# Patient Record
Sex: MALE | Race: WHITE | NOT HISPANIC OR LATINO
[De-identification: names, ages, dates, MRNs, and addresses within clinical notes are randomized per-mention and may not be internally consistent; named-entity substitution may affect disease eponyms.]

---

## 2017-01-31 ENCOUNTER — RX RENEWAL (OUTPATIENT)
Age: 62
End: 2017-01-31

## 2017-02-15 ENCOUNTER — APPOINTMENT (OUTPATIENT)
Dept: PULMONOLOGY | Facility: CLINIC | Age: 62
End: 2017-02-15

## 2017-02-15 VITALS — WEIGHT: 245 LBS | BODY MASS INDEX: 37.25 KG/M2

## 2017-02-15 VITALS — OXYGEN SATURATION: 92 % | HEART RATE: 95 BPM | DIASTOLIC BLOOD PRESSURE: 80 MMHG | SYSTOLIC BLOOD PRESSURE: 160 MMHG

## 2017-03-29 ENCOUNTER — RX RENEWAL (OUTPATIENT)
Age: 62
End: 2017-03-29

## 2017-04-12 ENCOUNTER — APPOINTMENT (OUTPATIENT)
Dept: PULMONOLOGY | Facility: CLINIC | Age: 62
End: 2017-04-12

## 2017-04-12 VITALS
HEART RATE: 142 BPM | RESPIRATION RATE: 16 BRPM | WEIGHT: 240 LBS | OXYGEN SATURATION: 94 % | SYSTOLIC BLOOD PRESSURE: 140 MMHG | BODY MASS INDEX: 36.49 KG/M2 | DIASTOLIC BLOOD PRESSURE: 80 MMHG

## 2017-04-12 DIAGNOSIS — Z86.79 PERSONAL HISTORY OF OTHER DISEASES OF THE CIRCULATORY SYSTEM: ICD-10-CM

## 2017-04-12 RX ORDER — VERAPAMIL HYDROCHLORIDE 300 MG/1
300 CAPSULE, EXTENDED RELEASE ORAL
Refills: 0 | Status: DISCONTINUED | COMMUNITY
End: 2017-04-12

## 2017-04-12 RX ORDER — AZITHROMYCIN 250 MG/1
250 TABLET, FILM COATED ORAL
Qty: 1 | Refills: 3 | Status: DISCONTINUED | COMMUNITY
Start: 2017-02-15 | End: 2017-04-12

## 2017-05-18 ENCOUNTER — RX RENEWAL (OUTPATIENT)
Age: 62
End: 2017-05-18

## 2017-05-27 ENCOUNTER — OUTPATIENT (OUTPATIENT)
Dept: OUTPATIENT SERVICES | Facility: HOSPITAL | Age: 62
LOS: 1 days | End: 2017-05-27
Payer: COMMERCIAL

## 2017-05-27 DIAGNOSIS — G47.33 OBSTRUCTIVE SLEEP APNEA (ADULT) (PEDIATRIC): ICD-10-CM

## 2017-05-27 PROCEDURE — 95806 SLEEP STUDY UNATT&RESP EFFT: CPT | Mod: 26

## 2017-05-27 PROCEDURE — 95806 SLEEP STUDY UNATT&RESP EFFT: CPT

## 2017-06-22 ENCOUNTER — APPOINTMENT (OUTPATIENT)
Dept: PULMONOLOGY | Facility: CLINIC | Age: 62
End: 2017-06-22

## 2017-07-07 ENCOUNTER — APPOINTMENT (OUTPATIENT)
Dept: PULMONOLOGY | Facility: CLINIC | Age: 62
End: 2017-07-07

## 2017-07-07 VITALS
OXYGEN SATURATION: 91 % | HEART RATE: 128 BPM | SYSTOLIC BLOOD PRESSURE: 152 MMHG | WEIGHT: 253 LBS | RESPIRATION RATE: 18 BRPM | DIASTOLIC BLOOD PRESSURE: 90 MMHG | BODY MASS INDEX: 38.47 KG/M2

## 2017-07-07 RX ORDER — BUDESONIDE AND FORMOTEROL FUMARATE DIHYDRATE 80; 4.5 UG/1; UG/1
80-4.5 AEROSOL RESPIRATORY (INHALATION)
Qty: 10 | Refills: 0 | Status: DISCONTINUED | COMMUNITY
Start: 2016-08-09 | End: 2017-07-07

## 2017-10-13 ENCOUNTER — APPOINTMENT (OUTPATIENT)
Dept: PULMONOLOGY | Facility: CLINIC | Age: 62
End: 2017-10-13
Payer: COMMERCIAL

## 2017-10-13 VITALS
WEIGHT: 252 LBS | SYSTOLIC BLOOD PRESSURE: 130 MMHG | HEIGHT: 68 IN | HEART RATE: 88 BPM | OXYGEN SATURATION: 92 % | DIASTOLIC BLOOD PRESSURE: 80 MMHG | BODY MASS INDEX: 38.19 KG/M2

## 2017-10-13 PROCEDURE — 99214 OFFICE O/P EST MOD 30 MIN: CPT

## 2017-10-13 RX ORDER — ALBUTEROL SULFATE 90 UG/1
108 (90 BASE) AEROSOL, METERED RESPIRATORY (INHALATION)
Qty: 1 | Refills: 5 | Status: DISCONTINUED | COMMUNITY
End: 2017-10-13

## 2018-01-12 ENCOUNTER — APPOINTMENT (OUTPATIENT)
Dept: PULMONOLOGY | Facility: CLINIC | Age: 63
End: 2018-01-12
Payer: COMMERCIAL

## 2018-01-12 VITALS — BODY MASS INDEX: 38.01 KG/M2 | WEIGHT: 250 LBS | SYSTOLIC BLOOD PRESSURE: 144 MMHG | DIASTOLIC BLOOD PRESSURE: 68 MMHG

## 2018-01-12 VITALS — OXYGEN SATURATION: 94 % | HEART RATE: 80 BPM

## 2018-01-12 PROCEDURE — 99214 OFFICE O/P EST MOD 30 MIN: CPT

## 2018-01-12 RX ORDER — LOSARTAN POTASSIUM AND HYDROCHLOROTHIAZIDE 12.5; 1 MG/1; MG/1
100-12.5 TABLET ORAL
Qty: 30 | Refills: 0 | Status: DISCONTINUED | COMMUNITY
Start: 2017-02-01 | End: 2018-01-12

## 2018-01-12 RX ORDER — AZITHROMYCIN 250 MG/1
250 TABLET, FILM COATED ORAL
Qty: 45 | Refills: 3 | Status: DISCONTINUED | COMMUNITY
Start: 2017-07-07 | End: 2018-01-12

## 2018-04-27 ENCOUNTER — APPOINTMENT (OUTPATIENT)
Dept: PULMONOLOGY | Facility: CLINIC | Age: 63
End: 2018-04-27
Payer: COMMERCIAL

## 2018-04-27 VITALS — OXYGEN SATURATION: 92 % | HEART RATE: 89 BPM

## 2018-04-27 VITALS — DIASTOLIC BLOOD PRESSURE: 60 MMHG | WEIGHT: 254 LBS | BODY MASS INDEX: 38.62 KG/M2 | SYSTOLIC BLOOD PRESSURE: 140 MMHG

## 2018-04-27 PROCEDURE — 99214 OFFICE O/P EST MOD 30 MIN: CPT

## 2018-09-07 ENCOUNTER — MESSAGE (OUTPATIENT)
Age: 63
End: 2018-09-07

## 2018-09-14 ENCOUNTER — APPOINTMENT (OUTPATIENT)
Dept: PULMONOLOGY | Facility: CLINIC | Age: 63
End: 2018-09-14
Payer: COMMERCIAL

## 2018-09-14 VITALS — HEART RATE: 119 BPM | OXYGEN SATURATION: 93 %

## 2018-09-14 VITALS — DIASTOLIC BLOOD PRESSURE: 70 MMHG | SYSTOLIC BLOOD PRESSURE: 120 MMHG

## 2018-09-14 PROCEDURE — 99214 OFFICE O/P EST MOD 30 MIN: CPT

## 2019-01-04 ENCOUNTER — APPOINTMENT (OUTPATIENT)
Dept: PULMONOLOGY | Facility: CLINIC | Age: 64
End: 2019-01-04

## 2019-02-22 ENCOUNTER — APPOINTMENT (OUTPATIENT)
Dept: PULMONOLOGY | Facility: CLINIC | Age: 64
End: 2019-02-22
Payer: COMMERCIAL

## 2019-02-22 VITALS — SYSTOLIC BLOOD PRESSURE: 130 MMHG | DIASTOLIC BLOOD PRESSURE: 60 MMHG

## 2019-02-22 VITALS — OXYGEN SATURATION: 93 % | HEART RATE: 89 BPM

## 2019-02-22 PROCEDURE — 99214 OFFICE O/P EST MOD 30 MIN: CPT

## 2019-02-22 NOTE — REVIEW OF SYSTEMS
[Fatigue] : fatigue [Cough] : cough [Dyspnea] : dyspnea [As Noted in HPI] : as noted in HPI [Negative] : Pulmonary Hypertension

## 2019-02-22 NOTE — CONSULT LETTER
[Dear  ___] : Dear  [unfilled], [Courtesy Letter:] : I had the pleasure of seeing your patient, [unfilled], in my office today. [Please see my note below.] : Please see my note below. [Consult Closing:] : Thank you very much for allowing me to participate in the care of this patient.  If you have any questions, please do not hesitate to contact me. [Sincerely,] : Sincerely, [Km Kirkland MD] : Km Kirkland MD

## 2019-02-27 ENCOUNTER — RX RENEWAL (OUTPATIENT)
Age: 64
End: 2019-02-27

## 2019-03-14 ENCOUNTER — MEDICATION RENEWAL (OUTPATIENT)
Age: 64
End: 2019-03-14

## 2019-07-12 ENCOUNTER — APPOINTMENT (OUTPATIENT)
Dept: PULMONOLOGY | Facility: CLINIC | Age: 64
End: 2019-07-12

## 2019-07-15 ENCOUNTER — OTHER (OUTPATIENT)
Age: 64
End: 2019-07-15

## 2019-09-25 NOTE — PHYSICAL EXAM
[General Appearance - Well Developed] : well developed [General Appearance - Well Nourished] : well nourished [Normal Conjunctiva] : the conjunctiva exhibited no abnormalities [Neck Appearance] : the appearance of the neck was normal [Jugular Venous Distention Increased] : there was no jugular-venous distention [Thyroid Diffuse Enlargement] : the thyroid was not enlarged [Heart Sounds] : normal S1 and S2 [Arterial Pulses Normal] : the arterial pulses were normal [Edema] : no peripheral edema present [Bowel Sounds] : normal bowel sounds [Abdomen Soft] : soft [Abdomen Tenderness] : non-tender [Abdomen Hernia] : no hernia was discovered [Abnormal Walk] : normal gait [No Focal Deficits] : no focal deficits [Oriented To Time, Place, And Person] : oriented to person, place, and time [Impaired Insight] : insight and judgment were intact [Affect] : the affect was normal [Memory Recent] : recent memory was not impaired [Normal Oropharynx] : normal oropharynx [Nail Clubbing] : no clubbing of the fingernails [Cyanosis, Localized] : no localized cyanosis [Skin Turgor] : normal skin turgor [] : no rash [FreeTextEntry1] : Scattered rhonchi.  Better post cough.  No rales

## 2019-09-25 NOTE — ASSESSMENT
[FreeTextEntry1] : Moderate to severe COPD .  02 not required for mild exertion - but should be available for nocturnal and prn moderate exertional use. \par Patient was able to return to work without 02;  \par Mild JOE  - no Rx required\par Cor Pulmonale - resolved\par New inflammatory nodule

## 2019-09-25 NOTE — DISCUSSION/SUMMARY
[FreeTextEntry1] : Devan : M, W, Fr\par Trelegy\par Low dose prednisone\par 02  for sleep daily - no longer required for exertion\par 4 month FU \par Pneumonia and influenza vaccination per guidelines\par Yearly low dose CT lung cancer screening planned - next in April of 2018 - ordered\par Pulmonary rehab if patient able advised\par A1AT serology\par FU CT in May\par Clinical FU in June\par Diet and exercise\par Clinical FU post CT\par Oral sleep appliance referral

## 2019-09-25 NOTE — HISTORY OF PRESENT ILLNESS
[FreeTextEntry1] :      62 yo male >40 pack year smoker (DC 3/16) with LLL pneumonia at [facility] - 3/22/16 and continuing at [facility] 4/14 - 4/18/16.  Cough, dyspnea despite prednisone 20mg - off one month taper and azith for 5 days.  Works at  [employer] for decades.  Around creams with fragranced, never affected him before.  No other exposures.  No rhinitis or reflux.  FH of longevity.. Moderate dyspnea on walking 2 blocks on level ground.  Moderate cough with minimal sputum production - worse with recumbency and improved with albuterol.  On Anoro and rescue albuterol. By June of 2016 - minimal mild cough.  Able to climb 2 flights of stairs rapidly without 02. \par Enjoys work.  Not strenuous.  Doesn't need 02 at work.  Sleeps with 02\par Productive cough on 2/15/17\par Afib\par Concerned about possible puja\par Works at night\par \par 4/27/18\par Working\par Not smoking\par Doing OK\par \par 9/14/18\par Ill early September of 2018\par Going for nuclear stress test\par \par 2/22/29\par Fell at home\par Injured right shoulder\par Seeing Dr Alexander who advised weight loss\par Out on disability for injury\par Resp status is Good\par Denied 02 - will recertify

## 2019-09-26 ENCOUNTER — APPOINTMENT (OUTPATIENT)
Dept: PULMONOLOGY | Facility: CLINIC | Age: 64
End: 2019-09-26
Payer: MEDICARE

## 2019-09-26 VITALS — DIASTOLIC BLOOD PRESSURE: 80 MMHG | SYSTOLIC BLOOD PRESSURE: 150 MMHG | WEIGHT: 242 LBS | BODY MASS INDEX: 36.8 KG/M2

## 2019-09-26 VITALS — HEART RATE: 127 BPM | OXYGEN SATURATION: 93 %

## 2019-09-26 PROCEDURE — 99214 OFFICE O/P EST MOD 30 MIN: CPT

## 2019-09-26 NOTE — CONSULT LETTER
[Dear  ___] : Dear  [unfilled], [Courtesy Letter:] : I had the pleasure of seeing your patient, [unfilled], in my office today. [Please see my note below.] : Please see my note below. [Sincerely,] : Sincerely, [Consult Closing:] : Thank you very much for allowing me to participate in the care of this patient.  If you have any questions, please do not hesitate to contact me. [Km Kirkland MD] : Km Kirkland MD

## 2019-09-30 ENCOUNTER — RX RENEWAL (OUTPATIENT)
Age: 64
End: 2019-09-30

## 2019-10-07 ENCOUNTER — MEDICATION RENEWAL (OUTPATIENT)
Age: 64
End: 2019-10-07

## 2019-10-07 RX ORDER — ALBUTEROL SULFATE 90 UG/1
108 (90 BASE) AEROSOL, METERED RESPIRATORY (INHALATION)
Qty: 3 | Refills: 3 | Status: DISCONTINUED | COMMUNITY
Start: 2017-07-07 | End: 2019-10-07

## 2019-10-07 RX ORDER — AZITHROMYCIN 250 MG/1
250 TABLET, FILM COATED ORAL
Qty: 45 | Refills: 5 | Status: DISCONTINUED | COMMUNITY
Start: 2017-10-13 | End: 2019-10-07

## 2019-10-11 NOTE — HISTORY OF PRESENT ILLNESS
[FreeTextEntry1] :      60 yo male >40 pack year smoker (DC 3/16) with LLL pneumonia at [facility] - 3/22/16 and continuing at [facility] 4/14 - 4/18/16.  Cough, dyspnea despite prednisone 20mg - off one month taper and azith for 5 days.  Works at  [employer] for decades.  Around creams with fragranced, never affected him before.  No other exposures.  No rhinitis or reflux.  FH of longevity.. Moderate dyspnea on walking 2 blocks on level ground.  Moderate cough with minimal sputum production - worse with recumbency and improved with albuterol.  On Anoro and rescue albuterol. By June of 2016 - minimal mild cough.  Able to climb 2 flights of stairs rapidly without 02. \par Enjoys work.  Not strenuous.  Doesn't need 02 at work.  Sleeps with 02\par Productive cough on 2/15/17\par Afib\par Concerned about possible puja\par Works at night\par \par 4/27/18\par Working\par Not smoking\par Doing OK\par \par 9/14/18\par Ill early September of 2018\par Going for nuclear stress test\par \par 2/22/29\par Fell at home\par Injured right shoulder\par Seeing Dr Alexander who advised weight loss\par Out on disability for injury\par Resp status is Good\par Denied 02 - will recertify

## 2019-10-11 NOTE — ASSESSMENT
[FreeTextEntry1] : Moderate to severe COPD .  02 not required for mild exertion - but should be available for nocturnal and prn moderate exertional use. \par Patient was able to return to work without 02;  \par Mild JOE  - no Rx required\par Cor Pulmonale - resolved\par \par 9/26/19\par New inflammatory nodule in January\par Retired\par New insurance \par Doing well\par Missed FU CT due to insurance change

## 2019-10-11 NOTE — DISCUSSION/SUMMARY
[FreeTextEntry1] : Devan : M, W, Fr\par Trelegy\par Low dose prednisone\par 02  for sleep daily - no longer required for exertion\par 3 month FU \par Pneumonia and influenza vaccination per guidelines\par Pulmonary rehab if patient able advised\par A1AT serology\par FU CT then yearly LDCT\par \par Diet and exercise\par Clinical FU post CT\par Oral sleep appliance referral

## 2019-10-11 NOTE — PHYSICAL EXAM
[General Appearance - Well Developed] : well developed [General Appearance - Well Nourished] : well nourished [Normal Conjunctiva] : the conjunctiva exhibited no abnormalities [Neck Appearance] : the appearance of the neck was normal [Jugular Venous Distention Increased] : there was no jugular-venous distention [Thyroid Diffuse Enlargement] : the thyroid was not enlarged [Heart Sounds] : normal S1 and S2 [Edema] : no peripheral edema present [Arterial Pulses Normal] : the arterial pulses were normal [Bowel Sounds] : normal bowel sounds [Abdomen Soft] : soft [Abdomen Tenderness] : non-tender [Abdomen Hernia] : no hernia was discovered [Abnormal Walk] : normal gait [No Focal Deficits] : no focal deficits [Oriented To Time, Place, And Person] : oriented to person, place, and time [Impaired Insight] : insight and judgment were intact [Affect] : the affect was normal [Memory Recent] : recent memory was not impaired [Normal Oropharynx] : normal oropharynx [Nail Clubbing] : no clubbing of the fingernails [Cyanosis, Localized] : no localized cyanosis [Skin Turgor] : normal skin turgor [] : no rash [FreeTextEntry1] : Scattered rhonchi.  Better post cough.  No rales

## 2019-12-19 ENCOUNTER — APPOINTMENT (OUTPATIENT)
Dept: PULMONOLOGY | Facility: CLINIC | Age: 64
End: 2019-12-19
Payer: MEDICARE

## 2019-12-19 VITALS
BODY MASS INDEX: 35.46 KG/M2 | HEIGHT: 68 IN | DIASTOLIC BLOOD PRESSURE: 78 MMHG | WEIGHT: 234 LBS | HEART RATE: 104 BPM | SYSTOLIC BLOOD PRESSURE: 122 MMHG | OXYGEN SATURATION: 86 %

## 2019-12-19 DIAGNOSIS — Z87.09 PERSONAL HISTORY OF OTHER DISEASES OF THE RESPIRATORY SYSTEM: ICD-10-CM

## 2019-12-19 PROCEDURE — 99214 OFFICE O/P EST MOD 30 MIN: CPT | Mod: 25

## 2019-12-19 RX ORDER — DEXAMETHASONE SODIUM PHOSPHATE 4 MG/ML
4 INJECTION, SOLUTION INTRAMUSCULAR; INTRAVENOUS
Qty: 0 | Refills: 0 | Status: COMPLETED | OUTPATIENT
Start: 2019-12-19

## 2019-12-19 RX ORDER — LOSARTAN POTASSIUM AND HYDROCHLOROTHIAZIDE 12.5; 1 MG/1; MG/1
100-12.5 TABLET ORAL
Refills: 0 | Status: DISCONTINUED | COMMUNITY
Start: 2017-10-31 | End: 2019-12-19

## 2019-12-19 RX ADMIN — DEXAMETHASONE SODIUM PHOSPHATE 1.5 MG/ML: 4 INJECTION, SOLUTION INTRA-ARTICULAR; INTRALESIONAL; INTRAMUSCULAR; INTRAVENOUS; SOFT TISSUE at 00:00

## 2019-12-19 NOTE — ASSESSMENT
[FreeTextEntry1] : Moderate to severe COPD .  02 not required for mild exertion - but should be available for nocturnal and prn moderate exertional use. \par Patient was able to return to work without 02;  \par Mild JOE  - no Rx required\par Cor Pulmonale - resolved\par

## 2019-12-19 NOTE — END OF VISIT
Hold chemo today    Give IV Magnesium 2 gram today    Will discuss with Dr Dudley regarding possibility of surgery    Will determine follow up after that   [Time Spent: ___ minutes] : I have spent [unfilled] minutes of face to face time with the patient [>50% of Time Spent on Counseling for ____] : Greater than 50% of the encounter time was spent on counseling for [unfilled]

## 2019-12-19 NOTE — HISTORY OF PRESENT ILLNESS
[FreeTextEntry1] :      60 yo male >40 pack year smoker (DC 3/16) with LLL pneumonia at [facility] - 3/22/16 and continuing at [facility] 4/14 - 4/18/16.  Cough, dyspnea despite prednisone 20mg - off one month taper and azith for 5 days.  Works at  [employer] for decades.  Around creams with fragranced, never affected him before.  No other exposures.  No rhinitis or reflux.  FH of longevity.. Moderate dyspnea on walking 2 blocks on level ground.  Moderate cough with minimal sputum production - worse with recumbency and improved with albuterol.  On Anoro and rescue albuterol. By June of 2016 - minimal mild cough.  Able to climb 2 flights of stairs rapidly without 02. \par Enjoys work.  Not strenuous.  Doesn't need 02 at work.  Sleeps with 02\par Productive cough on 2/15/17\par Afib\par Concerned about possible puja\par Works at night\par \par 4/27/18\par Working\par Not smoking\par Doing OK\par \par 9/14/18\par Ill early September of 2018\par Going for nuclear stress test\par \par 2/22/29\par Fell at home\par Injured right shoulder\par Seeing Dr Alexander who advised weight loss\par Out on disability for injury\par Resp status is Good\par Denied 02 - will recertify\par \par 12/19/19\par Recovering from recent URI\par PFT differed \par Decadron IM\par

## 2019-12-19 NOTE — PHYSICAL EXAM
[General Appearance - Well Developed] : well developed [Normal Conjunctiva] : the conjunctiva exhibited no abnormalities [General Appearance - Well Nourished] : well nourished [Neck Appearance] : the appearance of the neck was normal [Jugular Venous Distention Increased] : there was no jugular-venous distention [Thyroid Diffuse Enlargement] : the thyroid was not enlarged [Heart Sounds] : normal S1 and S2 [Edema] : no peripheral edema present [Arterial Pulses Normal] : the arterial pulses were normal [Bowel Sounds] : normal bowel sounds [Abdomen Soft] : soft [Abdomen Tenderness] : non-tender [Abnormal Walk] : normal gait [Abdomen Hernia] : no hernia was discovered [No Focal Deficits] : no focal deficits [Oriented To Time, Place, And Person] : oriented to person, place, and time [Impaired Insight] : insight and judgment were intact [Affect] : the affect was normal [Normal Oropharynx] : normal oropharynx [Memory Recent] : recent memory was not impaired [Nail Clubbing] : no clubbing of the fingernails [Cyanosis, Localized] : no localized cyanosis [Skin Turgor] : normal skin turgor [] : no rash [FreeTextEntry1] : Scattered rhonchi.  Better post cough.  No rales

## 2020-01-28 ENCOUNTER — APPOINTMENT (OUTPATIENT)
Dept: PULMONOLOGY | Facility: CLINIC | Age: 65
End: 2020-01-28
Payer: MEDICARE

## 2020-01-28 VITALS
OXYGEN SATURATION: 93 % | DIASTOLIC BLOOD PRESSURE: 82 MMHG | BODY MASS INDEX: 37.28 KG/M2 | HEART RATE: 120 BPM | HEIGHT: 68 IN | WEIGHT: 246 LBS | SYSTOLIC BLOOD PRESSURE: 130 MMHG

## 2020-01-28 PROCEDURE — 99214 OFFICE O/P EST MOD 30 MIN: CPT

## 2020-01-28 RX ORDER — PREDNISONE 10 MG/1
10 TABLET ORAL
Qty: 30 | Refills: 1 | Status: DISCONTINUED | COMMUNITY
Start: 2019-12-19 | End: 2020-01-28

## 2020-01-28 RX ORDER — CIPROFLOXACIN HYDROCHLORIDE 500 MG/1
500 TABLET, FILM COATED ORAL
Qty: 20 | Refills: 2 | Status: DISCONTINUED | COMMUNITY
Start: 2019-12-19 | End: 2020-01-28

## 2020-01-28 NOTE — PHYSICAL EXAM
[General Appearance - Well Developed] : well developed [General Appearance - Well Nourished] : well nourished [Neck Appearance] : the appearance of the neck was normal [Normal Conjunctiva] : the conjunctiva exhibited no abnormalities [Jugular Venous Distention Increased] : there was no jugular-venous distention [Heart Sounds] : normal S1 and S2 [Thyroid Diffuse Enlargement] : the thyroid was not enlarged [Arterial Pulses Normal] : the arterial pulses were normal [Edema] : no peripheral edema present [Bowel Sounds] : normal bowel sounds [Abdomen Soft] : soft [Abdomen Tenderness] : non-tender [Abdomen Hernia] : no hernia was discovered [Abnormal Walk] : normal gait [No Focal Deficits] : no focal deficits [Impaired Insight] : insight and judgment were intact [Oriented To Time, Place, And Person] : oriented to person, place, and time [Affect] : the affect was normal [Memory Recent] : recent memory was not impaired [Normal Oropharynx] : normal oropharynx [Nail Clubbing] : no clubbing of the fingernails [Cyanosis, Localized] : no localized cyanosis [Skin Turgor] : normal skin turgor [] : no rash [FreeTextEntry1] : Scattered rhonchi.  Better post cough.  No rales

## 2020-01-28 NOTE — HISTORY OF PRESENT ILLNESS
[FreeTextEntry1] :      60 yo male >40 pack year smoker (DC 3/16) with LLL pneumonia at [facility] - 3/22/16 and continuing at [facility] 4/14 - 4/18/16.  Cough, dyspnea despite prednisone 20mg - off one month taper and azith for 5 days.  Works at  [employer] for decades.  Around creams with fragranced, never affected him before.  No other exposures.  No rhinitis or reflux.  FH of longevity.. Moderate dyspnea on walking 2 blocks on level ground.  Moderate cough with minimal sputum production - worse with recumbency and improved with albuterol.  On Anoro and rescue albuterol. By June of 2016 - minimal mild cough.  Able to climb 2 flights of stairs rapidly without 02. \par Enjoys work.  Not strenuous.  Doesn't need 02 at work.  Sleeps with 02\par Productive cough on 2/15/17\par Afib\par Concerned about possible puja\par Works at night\par \par 4/27/18\par Working\par Not smoking\par Doing OK\par \par 9/14/18\par Ill early September of 2018\par Going for nuclear stress test\par \par 2/22/29\par Fell at home\par Injured right shoulder\par Seeing Dr Alexander who advised weight loss\par Out on disability for injury\par Resp status is Good\par Denied 02 - will recertify\par \par 12/19/19\par Recovering from recent URI\par PFT differed \par Decadron IM\par

## 2020-03-22 ENCOUNTER — RX RENEWAL (OUTPATIENT)
Age: 65
End: 2020-03-22

## 2020-07-21 ENCOUNTER — APPOINTMENT (OUTPATIENT)
Dept: PULMONOLOGY | Facility: CLINIC | Age: 65
End: 2020-07-21
Payer: MEDICARE

## 2020-07-21 VITALS
WEIGHT: 273 LBS | SYSTOLIC BLOOD PRESSURE: 138 MMHG | DIASTOLIC BLOOD PRESSURE: 88 MMHG | OXYGEN SATURATION: 91 % | HEIGHT: 68 IN | HEART RATE: 125 BPM | BODY MASS INDEX: 41.37 KG/M2

## 2020-07-21 DIAGNOSIS — Z20.828 CONTACT WITH AND (SUSPECTED) EXPOSURE TO OTHER VIRAL COMMUNICABLE DISEASES: ICD-10-CM

## 2020-07-21 DIAGNOSIS — F17.201 NICOTINE DEPENDENCE, UNSPECIFIED, IN REMISSION: ICD-10-CM

## 2020-07-21 PROCEDURE — 99214 OFFICE O/P EST MOD 30 MIN: CPT | Mod: 25

## 2020-07-21 PROCEDURE — G0296 VISIT TO DETERM LDCT ELIG: CPT

## 2020-07-21 NOTE — REVIEW OF SYSTEMS
[Fatigue] : fatigue [Cough] : cough [As Noted in HPI] : as noted in HPI [Dyspnea] : dyspnea [Negative] : Psychiatric

## 2020-07-21 NOTE — CONSULT LETTER
[Dear  ___] : Dear  [unfilled], [Please see my note below.] : Please see my note below. [Courtesy Letter:] : I had the pleasure of seeing your patient, [unfilled], in my office today. [Consult Closing:] : Thank you very much for allowing me to participate in the care of this patient.  If you have any questions, please do not hesitate to contact me. [Sincerely,] : Sincerely, [Km Kirkland MD] : Km Kirkland MD

## 2020-10-13 NOTE — PHYSICAL EXAM
[General Appearance - Well Developed] : well developed [General Appearance - Well Nourished] : well nourished [Normal Conjunctiva] : the conjunctiva exhibited no abnormalities [Neck Appearance] : the appearance of the neck was normal [Jugular Venous Distention Increased] : there was no jugular-venous distention [Heart Sounds] : normal S1 and S2 [Thyroid Diffuse Enlargement] : the thyroid was not enlarged [Edema] : no peripheral edema present [Arterial Pulses Normal] : the arterial pulses were normal [Bowel Sounds] : normal bowel sounds [Abdomen Tenderness] : non-tender [Abdomen Soft] : soft [Abnormal Walk] : normal gait [Abdomen Hernia] : no hernia was discovered [No Focal Deficits] : no focal deficits [Oriented To Time, Place, And Person] : oriented to person, place, and time [Impaired Insight] : insight and judgment were intact [Affect] : the affect was normal [Memory Recent] : recent memory was not impaired [Normal Oropharynx] : normal oropharynx [Nail Clubbing] : no clubbing of the fingernails [Skin Turgor] : normal skin turgor [Cyanosis, Localized] : no localized cyanosis [] : no rash [FreeTextEntry1] : Scattered rhonchi.  Better post cough.  No rales

## 2020-10-13 NOTE — HISTORY OF PRESENT ILLNESS
[FreeTextEntry1] :      60 yo male >40 pack year smoker (DC 3/16) with LLL pneumonia at [facility] - 3/22/16 and continuing at [facility] 4/14 - 4/18/16.  Cough, dyspnea despite prednisone 20mg - off one month taper and azith for 5 days.  Works at  [employer] for decades.  Around creams with fragranced, never affected him before.  No other exposures.  No rhinitis or reflux.  FH of longevity.. Moderate dyspnea on walking 2 blocks on level ground.  Moderate cough with minimal sputum production - worse with recumbency and improved with albuterol.  On Anoro and rescue albuterol. By June of 2016 - minimal mild cough.  Able to climb 2 flights of stairs rapidly without 02. \par Enjoys work.  Not strenuous.  Doesn't need 02 at work.  Sleeps with 02\par Productive cough on 2/15/17\par Afib\par Concerned about possible puja\par Works at night\par \par 4/27/18\par Working\par Not smoking\par Doing OK\par \par 9/14/18\par Ill early September of 2018\par Going for nuclear stress test\par \par 2/22/29\par Fell at home\par Injured right shoulder\par Seeing Dr Alexander who advised weight loss\par Out on disability for injury\par Resp status is Good\par Denied 02 - will recertify\par \par 12/19/19\par Recovering from recent URI\par PFT differed \par Decadron IM\par \par 7/21/20\par OK\par Gained weight during COVID\par The patient has been practicing safety guidelines for COVID-19 pandemic crisis (including social distancing, mask use and hand washing) and has been mostly homebound.\par No smoking since 3/16\par Managing OK\par No cough, sputum or edema\par \par

## 2020-10-19 ENCOUNTER — APPOINTMENT (OUTPATIENT)
Dept: PULMONOLOGY | Facility: CLINIC | Age: 65
End: 2020-10-19

## 2020-10-19 DIAGNOSIS — Z01.818 ENCOUNTER FOR OTHER PREPROCEDURAL EXAMINATION: ICD-10-CM

## 2020-10-20 ENCOUNTER — APPOINTMENT (OUTPATIENT)
Dept: DISASTER EMERGENCY | Facility: CLINIC | Age: 65
End: 2020-10-20

## 2020-10-21 LAB — SARS-COV-2 N GENE NPH QL NAA+PROBE: NOT DETECTED

## 2020-10-22 ENCOUNTER — APPOINTMENT (OUTPATIENT)
Dept: PULMONOLOGY | Facility: CLINIC | Age: 65
End: 2020-10-22
Payer: MEDICARE

## 2020-10-22 VITALS
HEIGHT: 68 IN | DIASTOLIC BLOOD PRESSURE: 90 MMHG | BODY MASS INDEX: 40.16 KG/M2 | RESPIRATION RATE: 18 BRPM | OXYGEN SATURATION: 92 % | WEIGHT: 265 LBS | HEART RATE: 120 BPM | SYSTOLIC BLOOD PRESSURE: 142 MMHG

## 2020-10-22 PROCEDURE — 99214 OFFICE O/P EST MOD 30 MIN: CPT | Mod: 25

## 2020-10-22 PROCEDURE — 99072 ADDL SUPL MATRL&STAF TM PHE: CPT

## 2020-10-22 NOTE — HISTORY OF PRESENT ILLNESS
[FreeTextEntry1] :      60 yo male >40 pack year smoker (DC 3/16) with LLL pneumonia at [facility] - 3/22/16 and continuing at [facility] 4/14 - 4/18/16.  Cough, dyspnea despite prednisone 20mg - off one month taper and azith for 5 days.  Works at  [employer] for decades.  Around creams with fragranced, never affected him before.  No other exposures.  No rhinitis or reflux.  FH of longevity.. Moderate dyspnea on walking 2 blocks on level ground.  Moderate cough with minimal sputum production - worse with recumbency and improved with albuterol.  On Anoro and rescue albuterol. By June of 2016 - minimal mild cough.  Able to climb 2 flights of stairs rapidly without 02. \par Enjoys work.  Not strenuous.  Doesn't need 02 at work.  Sleeps with 02\par Productive cough on 2/15/17\par Afib\par Concerned about possible puja\par Works at night\par \par 4/27/18\par Working\par Not smoking\par Doing OK\par \par 9/14/18\par Ill early September of 2018\par Going for nuclear stress test\par \par 2/22/29\par Fell at home\par Injured right shoulder\par Seeing Dr Alexander who advised weight loss\par Out on disability for injury\par Resp status is Good\par Denied 02 - will recertify\par \par 12/19/19\par Recovering from recent URI\par PFT differed \par Decadron IM\par \par 7/21/20\par OK\par Gained weight during COVID\par The patient has been practicing safety guidelines for COVID-19 pandemic crisis (including social distancing, mask use and hand washing) and has been mostly homebound.\par No smoking since 3/16\par Managing OK\par No cough, sputum or edema\par \par 10/22/20\par Fall with leg injury - no fracture\par Seeing Dr Alexander next week\par No cough, sputum or edema \par Not up to PFT today \par \par

## 2020-12-21 PROBLEM — Z87.09 HISTORY OF ACUTE BRONCHITIS: Status: RESOLVED | Noted: 2017-02-15 | Resolved: 2020-12-21

## 2020-12-30 ENCOUNTER — RX RENEWAL (OUTPATIENT)
Age: 65
End: 2020-12-30

## 2021-01-13 ENCOUNTER — APPOINTMENT (OUTPATIENT)
Dept: PULMONOLOGY | Facility: CLINIC | Age: 66
End: 2021-01-13
Payer: MEDICARE

## 2021-01-13 VITALS — WEIGHT: 248 LBS | SYSTOLIC BLOOD PRESSURE: 140 MMHG | BODY MASS INDEX: 37.71 KG/M2 | DIASTOLIC BLOOD PRESSURE: 80 MMHG

## 2021-01-13 VITALS — OXYGEN SATURATION: 96 % | RESPIRATION RATE: 16 BRPM | HEART RATE: 100 BPM

## 2021-01-13 PROCEDURE — 99214 OFFICE O/P EST MOD 30 MIN: CPT

## 2021-01-13 PROCEDURE — 99072 ADDL SUPL MATRL&STAF TM PHE: CPT

## 2021-01-13 RX ORDER — ALBUTEROL SULFATE 90 UG/1
108 (90 BASE) AEROSOL, METERED RESPIRATORY (INHALATION)
Qty: 3 | Refills: 1 | Status: COMPLETED | COMMUNITY
Start: 2019-10-07 | End: 2021-01-13

## 2021-03-09 NOTE — HISTORY OF PRESENT ILLNESS
[FreeTextEntry1] :      60 yo male >40 pack year smoker (DC 3/16) with LLL pneumonia at [facility] - 3/22/16 and continuing at [facility] 4/14 - 4/18/16.  Cough, dyspnea despite prednisone 20mg - off one month taper and azith for 5 days.  Works at  [employer] for decades.  Around creams with fragranced, never affected him before.  No other exposures.  No rhinitis or reflux.  FH of longevity.. Moderate dyspnea on walking 2 blocks on level ground.  Moderate cough with minimal sputum production - worse with recumbency and improved with albuterol.  On Anoro and rescue albuterol. By June of 2016 - minimal mild cough.  Able to climb 2 flights of stairs rapidly without 02. \par Enjoys work.  Not strenuous.  Doesn't need 02 at work.  Sleeps with 02\par Productive cough on 2/15/17\par Afib\par Concerned about possible puja\par Works at night\par \par 4/27/18\par Working\par Not smoking\par Doing OK\par \par 9/14/18\par Ill early September of 2018\par Going for nuclear stress test\par \par 2/22/29\par Fell at home\par Injured right shoulder\par Seeing Dr Alexander who advised weight loss\par Out on disability for injury\par Resp status is Good\par Denied 02 - will recertify\par \par 12/19/19\par Recovering from recent URI\par PFT differed \par Decadron IM\par \par 7/21/20\par OK\par Gained weight during COVID\par The patient has been practicing safety guidelines for COVID-19 pandemic crisis (including social distancing, mask use and hand washing) and has been mostly homebound.\par No smoking since 3/16\par Managing OK\par No cough, sputum or edema\par \par 10/22/20\par Fall with leg injury - no fracture\par Seeing Dr Alexander next week\par No cough, sputum or edema \par Not up to PFT today \par \par 1/13/21\par Better with 20 lb weight loss (with diet advised by Dr Alexander)\par No cough, sputum or edema\par \par

## 2021-03-09 NOTE — ASSESSMENT
[FreeTextEntry1] : Moderate to severe COPD .  \par Using and benefiting from 02 therapy for sleep and for anything more than very mild exertion \par Mild JOE  - no Rx required\par Cor Pulmonale - resolved\par New 7 mm RUL pulmonary nodule \par

## 2021-03-17 ENCOUNTER — APPOINTMENT (OUTPATIENT)
Dept: PULMONOLOGY | Facility: CLINIC | Age: 66
End: 2021-03-17
Payer: MEDICARE

## 2021-03-17 VITALS
RESPIRATION RATE: 16 BRPM | HEIGHT: 68 IN | HEART RATE: 99 BPM | WEIGHT: 248 LBS | SYSTOLIC BLOOD PRESSURE: 142 MMHG | BODY MASS INDEX: 37.59 KG/M2 | OXYGEN SATURATION: 94 % | TEMPERATURE: 98.3 F | DIASTOLIC BLOOD PRESSURE: 82 MMHG

## 2021-03-17 PROCEDURE — 99072 ADDL SUPL MATRL&STAF TM PHE: CPT

## 2021-03-17 PROCEDURE — 99214 OFFICE O/P EST MOD 30 MIN: CPT

## 2021-03-17 RX ORDER — ALBUTEROL SULFATE 90 UG/1
108 (90 BASE) INHALANT RESPIRATORY (INHALATION)
Qty: 2 | Refills: 2 | Status: COMPLETED | COMMUNITY
Start: 2021-03-17 | End: 2021-03-17

## 2021-03-17 NOTE — DISCUSSION/SUMMARY
[FreeTextEntry1] : Moderate to severe COPD .  \par Using and benefiting from 02 therapy for sleep and for anything more than very mild exertion \par Mild JOE  - no Rx required\par Obesity \par Cor Pulmonale - resolved\par New 7 mm RUL pulmonary nodule - resolved \par LDCT March 8,2021 Not worrisome \par Advised COVID 19 vaccination\par Patient prefers to get it in a doctors office - told patient to see if J&J vaccine might become available that way soon - otherwise encouraged him to seek vaccine elsewhere\par

## 2021-03-17 NOTE — HISTORY OF PRESENT ILLNESS
[FreeTextEntry1] :      62 yo male >40 pack year smoker (DC 3/16) with LLL pneumonia at [facility] - 3/22/16 and continuing at [facility] 4/14 - 4/18/16.  Cough, dyspnea despite prednisone 20mg - off one month taper and azith for 5 days.  Works at  [employer] for decades.  Around creams with fragranced, never affected him before.  No other exposures.  No rhinitis or reflux.  FH of longevity.. Moderate dyspnea on walking 2 blocks on level ground.  Moderate cough with minimal sputum production - worse with recumbency and improved with albuterol.  On Anoro and rescue albuterol. By June of 2016 - minimal mild cough.  Able to climb 2 flights of stairs rapidly without 02. \par Enjoys work.  Not strenuous.  Doesn't need 02 at work.  Sleeps with 02\par Productive cough on 2/15/17\par Afib\par Concerned about possible puja\par Works at night\par \par 4/27/18\par Working\par Not smoking\par Doing OK\par \par 9/14/18\par Ill early September of 2018\par Going for nuclear stress test\par \par 2/22/29\par Fell at home\par Injured right shoulder\par Seeing Dr Alexander who advised weight loss\par Out on disability for injury\par Resp status is Good\par Denied 02 - will recertify\par \par 12/19/19\par Recovering from recent URI\par PFT differed \par Decadron IM\par \par 7/21/20\par OK\par Gained weight during COVID\par The patient has been practicing safety guidelines for COVID-19 pandemic crisis (including social distancing, mask use and hand washing) and has been mostly homebound.\par No smoking since 3/16\par Managing OK\par No cough, sputum or edema\par \par 10/22/20\par Fall with leg injury - no fracture\par Seeing Dr Alexander next week\par No cough, sputum or edema \par Not up to PFT today \par \par 1/13/21\par Better with 20 lb weight loss (with diet advised by Dr Alexander)\par No cough, sputum or edema\par \par 3/17/21\par Only using albuterol now\par Hasn't gone for COVID vaccination \par The patient has been practicing safety guidelines for COVID-19 pandemic crisis (including social distancing, mask use and hand washing) and has been mostly homebound.\par \par \par

## 2021-04-22 ENCOUNTER — APPOINTMENT (OUTPATIENT)
Dept: PULMONOLOGY | Facility: CLINIC | Age: 66
End: 2021-04-22

## 2021-08-31 DIAGNOSIS — Z01.811 ENCOUNTER FOR PREPROCEDURAL RESPIRATORY EXAMINATION: ICD-10-CM

## 2021-09-11 ENCOUNTER — APPOINTMENT (OUTPATIENT)
Dept: DISASTER EMERGENCY | Facility: CLINIC | Age: 66
End: 2021-09-11

## 2021-09-15 ENCOUNTER — APPOINTMENT (OUTPATIENT)
Dept: PULMONOLOGY | Facility: CLINIC | Age: 66
End: 2021-09-15

## 2021-09-26 ENCOUNTER — APPOINTMENT (OUTPATIENT)
Dept: DISASTER EMERGENCY | Facility: CLINIC | Age: 66
End: 2021-09-26

## 2021-09-26 ENCOUNTER — LABORATORY RESULT (OUTPATIENT)
Age: 66
End: 2021-09-26

## 2021-09-29 ENCOUNTER — APPOINTMENT (OUTPATIENT)
Dept: PULMONOLOGY | Facility: CLINIC | Age: 66
End: 2021-09-29
Payer: MEDICARE

## 2021-09-29 VITALS
RESPIRATION RATE: 16 BRPM | SYSTOLIC BLOOD PRESSURE: 142 MMHG | DIASTOLIC BLOOD PRESSURE: 76 MMHG | HEART RATE: 84 BPM | OXYGEN SATURATION: 98 %

## 2021-09-29 VITALS — BODY MASS INDEX: 31.18 KG/M2 | WEIGHT: 201 LBS | HEIGHT: 67.5 IN

## 2021-09-29 DIAGNOSIS — R91.1 SOLITARY PULMONARY NODULE: ICD-10-CM

## 2021-09-29 PROCEDURE — 99213 OFFICE O/P EST LOW 20 MIN: CPT | Mod: 25

## 2021-09-29 PROCEDURE — 85018 HEMOGLOBIN: CPT | Mod: QW

## 2021-09-29 PROCEDURE — 94010 BREATHING CAPACITY TEST: CPT

## 2021-09-29 PROCEDURE — 94729 DIFFUSING CAPACITY: CPT

## 2021-09-29 PROCEDURE — 94727 GAS DIL/WSHOT DETER LNG VOL: CPT

## 2021-09-29 RX ORDER — AZITHROMYCIN 250 MG/1
250 TABLET, FILM COATED ORAL
Qty: 12 | Refills: 5 | Status: DISCONTINUED | COMMUNITY
Start: 2019-10-07 | End: 2021-09-29

## 2021-09-29 RX ORDER — FLUTICASONE FUROATE, UMECLIDINIUM BROMIDE AND VILANTEROL TRIFENATATE 100; 62.5; 25 UG/1; UG/1; UG/1
100-62.5-25 POWDER RESPIRATORY (INHALATION) DAILY
Qty: 3 | Refills: 3 | Status: ACTIVE | COMMUNITY
Start: 2018-09-14

## 2022-03-31 ENCOUNTER — APPOINTMENT (OUTPATIENT)
Dept: PULMONOLOGY | Facility: CLINIC | Age: 67
End: 2022-03-31

## 2022-06-23 NOTE — DISCUSSION/SUMMARY
[FreeTextEntry1] : Moderate to severe COPD .  \par Using and benefiting from 02 therapy for sleep and for anything more than very mild exertion \par Mild JOE  - no Rx required\par Obesity \par Cor Pulmonale - resolved\par New 7 mm RUL pulmonary nodule - resolved \par LDCT March 8,2021 Not worrisome \par Got COVID 19 vaccination\par \par

## 2022-06-23 NOTE — HISTORY OF PRESENT ILLNESS
[FreeTextEntry1] :      60 yo male >40 pack year smoker (DC 3/16) with LLL pneumonia at [facility] - 3/22/16 and continuing at [facility] 4/14 - 4/18/16.  Cough, dyspnea despite prednisone 20mg - off one month taper and azith for 5 days.  Works at  [employer] for decades.  Around creams with fragranced, never affected him before.  No other exposures.  No rhinitis or reflux.  FH of longevity.. Moderate dyspnea on walking 2 blocks on level ground.  Moderate cough with minimal sputum production - worse with recumbency and improved with albuterol.  On Anoro and rescue albuterol. By June of 2016 - minimal mild cough.  Able to climb 2 flights of stairs rapidly without 02. \par Enjoys work.  Not strenuous.  Doesn't need 02 at work.  Sleeps with 02\par Productive cough on 2/15/17\par Afib\par Concerned about possible puja\par Works at night\par \par 4/27/18\par Working\par Not smoking\par Doing OK\par \par 9/14/18\par Ill early September of 2018\par Going for nuclear stress test\par \par 2/22/29\par Fell at home\par Injured right shoulder\par Seeing Dr Alexander who advised weight loss\par Out on disability for injury\par Resp status is Good\par Denied 02 - will recertify\par \par 12/19/19\par Recovering from recent URI\par PFT differed \par Decadron IM\par \par 7/21/20\par OK\par Gained weight during COVID\par The patient has been practicing safety guidelines for COVID-19 pandemic crisis (including social distancing, mask use and hand washing) and has been mostly homebound.\par No smoking since 3/16\par Managing OK\par No cough, sputum or edema\par \par 10/22/20\par Fall with leg injury - no fracture\par Seeing Dr Alexander next week\par No cough, sputum or edema \par Not up to PFT today \par \par 1/13/21\par Better with 20 lb weight loss (with diet advised by Dr Alexander)\par No cough, sputum or edema\par \par 3/17/21\par Only using albuterol now\par Hasn't gone for COVID vaccination \par The patient has been practicing safety guidelines for COVID-19 pandemic crisis (including social distancing, mask use and hand washing) and has been mostly homebound.\par \par 9/29/21\par Doing OK on Trelegy and rescue albuterol\par No cough or sputum \par

## 2022-06-24 ENCOUNTER — APPOINTMENT (OUTPATIENT)
Dept: PULMONOLOGY | Facility: CLINIC | Age: 67
End: 2022-06-24

## 2023-02-28 ENCOUNTER — OFFICE (OUTPATIENT)
Dept: URBAN - METROPOLITAN AREA CLINIC 104 | Facility: CLINIC | Age: 68
Setting detail: OPHTHALMOLOGY
End: 2023-02-28
Payer: MEDICARE

## 2023-02-28 DIAGNOSIS — H43.811: ICD-10-CM

## 2023-02-28 DIAGNOSIS — H35.3221: ICD-10-CM

## 2023-02-28 DIAGNOSIS — H35.54: ICD-10-CM

## 2023-02-28 DIAGNOSIS — H26.491: ICD-10-CM

## 2023-02-28 DIAGNOSIS — H04.123: ICD-10-CM

## 2023-02-28 DIAGNOSIS — Z96.1: ICD-10-CM

## 2023-02-28 DIAGNOSIS — H25.12: ICD-10-CM

## 2023-02-28 PROCEDURE — 92134 CPTRZ OPH DX IMG PST SGM RTA: CPT | Performed by: SPECIALIST

## 2023-02-28 PROCEDURE — 92004 COMPRE OPH EXAM NEW PT 1/>: CPT | Performed by: SPECIALIST

## 2023-02-28 ASSESSMENT — REFRACTION_CURRENTRX
OD_SPHERE: +0.50
OD_AXIS: 0
OS_SPHERE: +1.50
OS_VPRISM_DIRECTION: PROGS
OS_OVR_VA: 20/
OD_OVR_VA: 20/
OS_CYLINDER: -0.25
OD_CYLINDER: SPH
OS_AXIS: 132
OD_ADD: +2.50
OS_ADD: +2.50
OD_VPRISM_DIRECTION: PROGS

## 2023-02-28 ASSESSMENT — CONFRONTATIONAL VISUAL FIELD TEST (CVF)
OD_FINDINGS: FULL
OS_FINDINGS: FULL

## 2023-02-28 ASSESSMENT — REFRACTION_AUTOREFRACTION
OS_CYLINDER: -0.50
OD_CYLINDER: -1.00
OD_AXIS: 089
OS_AXIS: 149
OD_SPHERE: +1.75
OS_SPHERE: +1.50

## 2023-02-28 ASSESSMENT — SPHEQUIV_DERIVED
OS_SPHEQUIV: 1.375
OS_SPHEQUIV: 1.25
OD_SPHEQUIV: 1.25

## 2023-02-28 ASSESSMENT — VISUAL ACUITY
OS_BCVA: 20/25+1
OD_BCVA: 20/60

## 2023-02-28 ASSESSMENT — REFRACTION_MANIFEST
OS_AXIS: 130
OD_SPHERE: +0.50
OS_ADD: +2.50
OD_ADD: +2.50
OD_AXIS: 0
OS_CYLINDER: -0.25
OD_CYLINDER: SPH
OS_SPHERE: +1.50

## 2023-02-28 ASSESSMENT — TEAR BREAK UP TIME (TBUT)
OD_TBUT: 3+
OS_TBUT: 3+

## 2023-03-03 ENCOUNTER — OFFICE (OUTPATIENT)
Dept: URBAN - METROPOLITAN AREA CLINIC 63 | Facility: CLINIC | Age: 68
Setting detail: OPHTHALMOLOGY
End: 2023-03-03
Payer: MEDICARE

## 2023-03-03 DIAGNOSIS — H35.3221: ICD-10-CM

## 2023-03-03 DIAGNOSIS — H35.3112: ICD-10-CM

## 2023-03-03 DIAGNOSIS — H43.811: ICD-10-CM

## 2023-03-03 PROBLEM — H26.491 POSTERIOR CAPSULAR OPACIFICATION; RIGHT EYE: Status: ACTIVE | Noted: 2023-02-28

## 2023-03-03 PROCEDURE — 92235 FLUORESCEIN ANGRPH MLTIFRAME: CPT | Performed by: SPECIALIST

## 2023-03-03 PROCEDURE — 67028 INJECTION EYE DRUG: CPT | Performed by: SPECIALIST

## 2023-03-03 PROCEDURE — 92014 COMPRE OPH EXAM EST PT 1/>: CPT | Performed by: SPECIALIST

## 2023-03-03 PROCEDURE — 92134 CPTRZ OPH DX IMG PST SGM RTA: CPT | Performed by: SPECIALIST

## 2023-03-03 ASSESSMENT — AXIALLENGTH_DERIVED
OS_AL: 23.21
OD_AL: 23.25

## 2023-03-03 ASSESSMENT — KERATOMETRY
OD_AXISANGLE_DEGREES: 056
OS_K2POWER_DIOPTERS: 43.72
OD_K1POWER_DIOPTERS: 43.05
OS_AXISANGLE_DEGREES: 089
OD_K2POWER_DIOPTERS: 43.21
OS_K1POWER_DIOPTERS: 42.72

## 2023-03-03 ASSESSMENT — SPHEQUIV_DERIVED
OS_SPHEQUIV: 1.25
OD_SPHEQUIV: 1.25

## 2023-03-03 ASSESSMENT — CONFRONTATIONAL VISUAL FIELD TEST (CVF)
OD_FINDINGS: FULL
OS_FINDINGS: FULL

## 2023-03-03 ASSESSMENT — REFRACTION_AUTOREFRACTION
OD_AXIS: 089
OS_AXIS: 149
OD_SPHERE: +1.75
OS_CYLINDER: -0.50
OS_SPHERE: +1.50
OD_CYLINDER: -1.00

## 2023-03-03 ASSESSMENT — VISUAL ACUITY
OD_BCVA: 20/40-1
OS_BCVA: 20/25+1

## 2023-03-03 ASSESSMENT — TEAR BREAK UP TIME (TBUT)
OD_TBUT: 3+
OS_TBUT: 3+

## 2023-03-03 ASSESSMENT — TONOMETRY
OS_IOP_MMHG: 20
OD_IOP_MMHG: 17

## 2023-05-04 ENCOUNTER — OFFICE (OUTPATIENT)
Dept: URBAN - METROPOLITAN AREA CLINIC 63 | Facility: CLINIC | Age: 68
Setting detail: OPHTHALMOLOGY
End: 2023-05-04
Payer: MEDICARE

## 2023-05-04 DIAGNOSIS — H43.811: ICD-10-CM

## 2023-05-04 DIAGNOSIS — H35.3112: ICD-10-CM

## 2023-05-04 DIAGNOSIS — H35.3221: ICD-10-CM

## 2023-05-04 PROCEDURE — 92134 CPTRZ OPH DX IMG PST SGM RTA: CPT | Performed by: SPECIALIST

## 2023-05-04 PROCEDURE — 92012 INTRM OPH EXAM EST PATIENT: CPT | Performed by: SPECIALIST

## 2023-05-04 ASSESSMENT — KERATOMETRY
OS_AXISANGLE_DEGREES: 089
OD_AXISANGLE_DEGREES: 056
OD_K1POWER_DIOPTERS: 43.05
OD_K2POWER_DIOPTERS: 43.21
OS_K1POWER_DIOPTERS: 42.72
OS_K2POWER_DIOPTERS: 43.72

## 2023-05-04 ASSESSMENT — REFRACTION_AUTOREFRACTION
OS_AXIS: 149
OD_AXIS: 089
OD_CYLINDER: -1.00
OS_CYLINDER: -0.50
OD_SPHERE: +1.75
OS_SPHERE: +1.50

## 2023-05-04 ASSESSMENT — TEAR BREAK UP TIME (TBUT)
OS_TBUT: 3+
OD_TBUT: 3+

## 2023-05-04 ASSESSMENT — SPHEQUIV_DERIVED
OD_SPHEQUIV: 1.25
OS_SPHEQUIV: 1.25

## 2023-05-04 ASSESSMENT — VISUAL ACUITY
OD_BCVA: 20/40-1
OS_BCVA: 20/25-1

## 2023-05-04 ASSESSMENT — AXIALLENGTH_DERIVED
OD_AL: 23.25
OS_AL: 23.21

## 2023-05-17 ENCOUNTER — OFFICE (OUTPATIENT)
Dept: URBAN - METROPOLITAN AREA CLINIC 63 | Facility: CLINIC | Age: 68
Setting detail: OPHTHALMOLOGY
End: 2023-05-17
Payer: MEDICARE

## 2023-05-17 DIAGNOSIS — H35.3221: ICD-10-CM

## 2023-05-17 DIAGNOSIS — H35.3112: ICD-10-CM

## 2023-05-17 DIAGNOSIS — H43.811: ICD-10-CM

## 2023-05-17 PROCEDURE — 67028 INJECTION EYE DRUG: CPT | Performed by: SPECIALIST

## 2023-05-17 PROCEDURE — 92134 CPTRZ OPH DX IMG PST SGM RTA: CPT | Performed by: SPECIALIST

## 2023-05-17 ASSESSMENT — KERATOMETRY
OS_AXISANGLE_DEGREES: 089
OD_AXISANGLE_DEGREES: 056
OD_K2POWER_DIOPTERS: 43.21
OD_K1POWER_DIOPTERS: 43.05
OS_K1POWER_DIOPTERS: 42.72
OS_K2POWER_DIOPTERS: 43.72

## 2023-05-17 ASSESSMENT — REFRACTION_AUTOREFRACTION
OS_SPHERE: +1.50
OS_AXIS: 149
OD_AXIS: 089
OD_CYLINDER: -1.00
OS_CYLINDER: -0.50
OD_SPHERE: +1.75

## 2023-05-17 ASSESSMENT — CONFRONTATIONAL VISUAL FIELD TEST (CVF)
OS_FINDINGS: FULL
OD_FINDINGS: FULL

## 2023-05-17 ASSESSMENT — AXIALLENGTH_DERIVED
OS_AL: 23.21
OD_AL: 23.25

## 2023-05-17 ASSESSMENT — VISUAL ACUITY
OD_BCVA: 20/30-1
OS_BCVA: 20/25

## 2023-05-17 ASSESSMENT — SPHEQUIV_DERIVED
OS_SPHEQUIV: 1.25
OD_SPHEQUIV: 1.25

## 2023-05-17 ASSESSMENT — TONOMETRY
OD_IOP_MMHG: 17
OS_IOP_MMHG: 17

## 2023-05-17 ASSESSMENT — TEAR BREAK UP TIME (TBUT)
OD_TBUT: 3+
OS_TBUT: 3+

## 2023-08-18 ENCOUNTER — OFFICE (OUTPATIENT)
Dept: URBAN - METROPOLITAN AREA CLINIC 63 | Facility: CLINIC | Age: 68
Setting detail: OPHTHALMOLOGY
End: 2023-08-18
Payer: MEDICARE

## 2023-08-18 DIAGNOSIS — H35.3112: ICD-10-CM

## 2023-08-18 DIAGNOSIS — H35.3221: ICD-10-CM

## 2023-08-18 DIAGNOSIS — H43.811: ICD-10-CM

## 2023-08-18 PROCEDURE — 67028 INJECTION EYE DRUG: CPT | Performed by: SPECIALIST

## 2023-08-18 PROCEDURE — 92134 CPTRZ OPH DX IMG PST SGM RTA: CPT | Performed by: SPECIALIST

## 2023-08-18 ASSESSMENT — REFRACTION_AUTOREFRACTION
OS_CYLINDER: -0.50
OS_SPHERE: +1.50
OD_AXIS: 089
OD_SPHERE: +1.75
OD_CYLINDER: -1.00
OS_AXIS: 149

## 2023-08-18 ASSESSMENT — TEAR BREAK UP TIME (TBUT)
OD_TBUT: 3+
OS_TBUT: 3+

## 2023-08-18 ASSESSMENT — TONOMETRY
OD_IOP_MMHG: 14
OS_IOP_MMHG: 14

## 2023-08-18 ASSESSMENT — CONFRONTATIONAL VISUAL FIELD TEST (CVF)
OD_FINDINGS: FULL
OS_FINDINGS: FULL

## 2023-08-18 ASSESSMENT — KERATOMETRY
OS_K2POWER_DIOPTERS: 43.72
OS_AXISANGLE_DEGREES: 089
OD_K1POWER_DIOPTERS: 43.05
OS_K1POWER_DIOPTERS: 42.72
OD_AXISANGLE_DEGREES: 056
OD_K2POWER_DIOPTERS: 43.21

## 2023-08-18 ASSESSMENT — SPHEQUIV_DERIVED
OD_SPHEQUIV: 1.25
OS_SPHEQUIV: 1.25

## 2023-08-18 ASSESSMENT — VISUAL ACUITY
OS_BCVA: 20/30
OD_BCVA: 20/40

## 2023-08-18 ASSESSMENT — AXIALLENGTH_DERIVED
OD_AL: 23.25
OS_AL: 23.21

## 2023-09-07 ENCOUNTER — OFFICE (OUTPATIENT)
Dept: URBAN - METROPOLITAN AREA CLINIC 63 | Facility: CLINIC | Age: 68
Setting detail: OPHTHALMOLOGY
End: 2023-09-07
Payer: MEDICARE

## 2023-09-07 DIAGNOSIS — H35.3112: ICD-10-CM

## 2023-09-07 DIAGNOSIS — H35.3221: ICD-10-CM

## 2023-09-07 DIAGNOSIS — H43.811: ICD-10-CM

## 2023-09-07 PROCEDURE — 92134 CPTRZ OPH DX IMG PST SGM RTA: CPT | Performed by: SPECIALIST

## 2023-09-07 PROCEDURE — 92012 INTRM OPH EXAM EST PATIENT: CPT | Performed by: SPECIALIST

## 2023-09-07 ASSESSMENT — REFRACTION_AUTOREFRACTION
OS_SPHERE: +1.50
OS_AXIS: 149
OD_CYLINDER: -1.00
OD_AXIS: 089
OD_SPHERE: +1.75
OS_CYLINDER: -0.50

## 2023-09-07 ASSESSMENT — AXIALLENGTH_DERIVED
OD_AL: 23.25
OS_AL: 23.21

## 2023-09-07 ASSESSMENT — VISUAL ACUITY
OD_BCVA: 20/40
OS_BCVA: 20/30

## 2023-09-07 ASSESSMENT — KERATOMETRY
OS_K1POWER_DIOPTERS: 42.72
OS_AXISANGLE_DEGREES: 089
OD_K1POWER_DIOPTERS: 43.05
OD_K2POWER_DIOPTERS: 43.21
OS_K2POWER_DIOPTERS: 43.72
OD_AXISANGLE_DEGREES: 056

## 2023-09-07 ASSESSMENT — SPHEQUIV_DERIVED
OD_SPHEQUIV: 1.25
OS_SPHEQUIV: 1.25

## 2023-09-07 ASSESSMENT — TONOMETRY
OD_IOP_MMHG: 16
OS_IOP_MMHG: 17

## 2023-09-07 ASSESSMENT — TEAR BREAK UP TIME (TBUT)
OD_TBUT: 3+
OS_TBUT: 3+

## 2023-09-14 ENCOUNTER — APPOINTMENT (OUTPATIENT)
Dept: PULMONOLOGY | Facility: CLINIC | Age: 68
End: 2023-09-14
Payer: MEDICARE

## 2023-09-14 ENCOUNTER — TRANSCRIPTION ENCOUNTER (OUTPATIENT)
Age: 68
End: 2023-09-14

## 2023-09-14 VITALS
RESPIRATION RATE: 16 BRPM | OXYGEN SATURATION: 96 % | DIASTOLIC BLOOD PRESSURE: 80 MMHG | SYSTOLIC BLOOD PRESSURE: 160 MMHG | BODY MASS INDEX: 32.41 KG/M2 | HEART RATE: 68 BPM | WEIGHT: 210 LBS

## 2023-09-14 PROCEDURE — 99213 OFFICE O/P EST LOW 20 MIN: CPT

## 2023-09-14 RX ORDER — FLUTICASONE FUROATE, UMECLIDINIUM BROMIDE AND VILANTEROL TRIFENATATE 200; 62.5; 25 UG/1; UG/1; UG/1
200-62.5-25 POWDER RESPIRATORY (INHALATION) DAILY
Qty: 3 | Refills: 1 | Status: ACTIVE | COMMUNITY
Start: 2023-09-14 | End: 1900-01-01

## 2023-09-14 RX ORDER — LOSARTAN POTASSIUM AND HYDROCHLOROTHIAZIDE 25; 100 MG/1; MG/1
100-25 TABLET ORAL
Qty: 30 | Refills: 0 | Status: COMPLETED | COMMUNITY
Start: 2017-10-31 | End: 2023-09-14

## 2023-09-14 RX ORDER — ATORVASTATIN CALCIUM 80 MG/1
TABLET, FILM COATED ORAL
Refills: 0 | Status: COMPLETED | COMMUNITY
End: 2023-09-14

## 2023-09-14 RX ORDER — ALBUTEROL SULFATE 90 UG/1
108 (90 BASE) INHALANT RESPIRATORY (INHALATION)
Qty: 3 | Refills: 2 | Status: ACTIVE | COMMUNITY
Start: 2023-09-14 | End: 1900-01-01

## 2023-09-14 RX ORDER — AMLODIPINE BESYLATE 5 MG/1
TABLET ORAL
Refills: 0 | Status: ACTIVE | COMMUNITY

## 2023-09-14 RX ORDER — LOSARTAN POTASSIUM 100 MG/1
TABLET, FILM COATED ORAL
Refills: 0 | Status: ACTIVE | COMMUNITY

## 2023-09-14 RX ORDER — ATORVASTATIN CALCIUM 80 MG/1
TABLET, FILM COATED ORAL
Refills: 0 | Status: ACTIVE | COMMUNITY

## 2023-09-15 ENCOUNTER — TRANSCRIPTION ENCOUNTER (OUTPATIENT)
Age: 68
End: 2023-09-15

## 2023-09-15 RX ORDER — IPRATROPIUM BROMIDE AND ALBUTEROL SULFATE 2.5; .5 MG/3ML; MG/3ML
0.5-2.5 (3) SOLUTION RESPIRATORY (INHALATION) 4 TIMES DAILY
Qty: 360 | Refills: 5 | Status: ACTIVE | COMMUNITY
Start: 2023-09-15 | End: 1900-01-01

## 2023-09-19 ENCOUNTER — TRANSCRIPTION ENCOUNTER (OUTPATIENT)
Age: 68
End: 2023-09-19

## 2023-10-06 ENCOUNTER — OFFICE (OUTPATIENT)
Dept: URBAN - METROPOLITAN AREA CLINIC 63 | Facility: CLINIC | Age: 68
Setting detail: OPHTHALMOLOGY
End: 2023-10-06
Payer: MEDICARE

## 2023-10-06 DIAGNOSIS — H35.3221: ICD-10-CM

## 2023-10-06 DIAGNOSIS — H35.3112: ICD-10-CM

## 2023-10-06 PROCEDURE — 92134 CPTRZ OPH DX IMG PST SGM RTA: CPT | Performed by: SPECIALIST

## 2023-10-06 PROCEDURE — 67028 INJECTION EYE DRUG: CPT | Mod: LT | Performed by: SPECIALIST

## 2023-10-06 ASSESSMENT — REFRACTION_AUTOREFRACTION
OD_AXIS: 089
OD_CYLINDER: -1.00
OS_CYLINDER: -0.50
OS_AXIS: 149
OS_SPHERE: +1.50
OD_SPHERE: +1.75

## 2023-10-06 ASSESSMENT — VISUAL ACUITY
OD_BCVA: 20/30-2
OS_BCVA: 20/30-1

## 2023-10-06 ASSESSMENT — AXIALLENGTH_DERIVED
OS_AL: 23.21
OD_AL: 23.25

## 2023-10-06 ASSESSMENT — TONOMETRY
OS_IOP_MMHG: 19
OD_IOP_MMHG: 18

## 2023-10-06 ASSESSMENT — SPHEQUIV_DERIVED
OS_SPHEQUIV: 1.25
OD_SPHEQUIV: 1.25

## 2023-10-06 ASSESSMENT — KERATOMETRY
OS_AXISANGLE_DEGREES: 089
OD_K1POWER_DIOPTERS: 43.05
OS_K1POWER_DIOPTERS: 42.72
OD_AXISANGLE_DEGREES: 056
OD_K2POWER_DIOPTERS: 43.21
OS_K2POWER_DIOPTERS: 43.72

## 2023-10-06 ASSESSMENT — CONFRONTATIONAL VISUAL FIELD TEST (CVF)
OD_FINDINGS: FULL
OS_FINDINGS: FULL

## 2023-10-06 ASSESSMENT — TEAR BREAK UP TIME (TBUT)
OD_TBUT: 3+
OS_TBUT: 3+

## 2023-10-07 ENCOUNTER — APPOINTMENT (OUTPATIENT)
Dept: CT IMAGING | Facility: CLINIC | Age: 68
End: 2023-10-07

## 2023-10-07 ENCOUNTER — RESULT REVIEW (OUTPATIENT)
Age: 68
End: 2023-10-07

## 2023-10-07 ENCOUNTER — OUTPATIENT (OUTPATIENT)
Dept: OUTPATIENT SERVICES | Facility: HOSPITAL | Age: 68
LOS: 1 days | End: 2023-10-07
Payer: MEDICARE

## 2023-10-07 DIAGNOSIS — E66.9 OBESITY, UNSPECIFIED: ICD-10-CM

## 2023-10-07 PROCEDURE — 71250 CT THORAX DX C-: CPT | Mod: 26

## 2023-10-18 ENCOUNTER — NON-APPOINTMENT (OUTPATIENT)
Age: 68
End: 2023-10-18

## 2023-11-14 ENCOUNTER — APPOINTMENT (OUTPATIENT)
Dept: PULMONOLOGY | Facility: CLINIC | Age: 68
End: 2023-11-14

## 2023-11-14 ENCOUNTER — APPOINTMENT (OUTPATIENT)
Dept: PULMONOLOGY | Facility: CLINIC | Age: 68
End: 2023-11-14
Payer: MEDICARE

## 2023-11-14 VITALS
DIASTOLIC BLOOD PRESSURE: 80 MMHG | HEART RATE: 96 BPM | OXYGEN SATURATION: 95 % | SYSTOLIC BLOOD PRESSURE: 130 MMHG | RESPIRATION RATE: 16 BRPM

## 2023-11-14 VITALS — WEIGHT: 220 LBS | HEIGHT: 67.5 IN | BODY MASS INDEX: 34.13 KG/M2

## 2023-11-14 DIAGNOSIS — G47.33 OBSTRUCTIVE SLEEP APNEA (ADULT) (PEDIATRIC): ICD-10-CM

## 2023-11-14 PROCEDURE — 99213 OFFICE O/P EST LOW 20 MIN: CPT | Mod: 25

## 2023-11-14 PROCEDURE — 94729 DIFFUSING CAPACITY: CPT

## 2023-11-14 PROCEDURE — 85018 HEMOGLOBIN: CPT | Mod: QW

## 2023-11-14 PROCEDURE — 94727 GAS DIL/WSHOT DETER LNG VOL: CPT

## 2023-11-14 PROCEDURE — 94010 BREATHING CAPACITY TEST: CPT

## 2023-11-14 RX ORDER — VERAPAMIL HYDROCHLORIDE 180 MG/1
180 CAPSULE, DELAYED RELEASE PELLETS ORAL
Refills: 0 | Status: COMPLETED | COMMUNITY
End: 2023-11-14

## 2023-11-15 ENCOUNTER — OFFICE (OUTPATIENT)
Dept: URBAN - METROPOLITAN AREA CLINIC 63 | Facility: CLINIC | Age: 68
Setting detail: OPHTHALMOLOGY
End: 2023-11-15
Payer: MEDICARE

## 2023-11-15 DIAGNOSIS — H35.3221: ICD-10-CM

## 2023-11-15 DIAGNOSIS — H43.811: ICD-10-CM

## 2023-11-15 DIAGNOSIS — H35.3112: ICD-10-CM

## 2023-11-15 PROCEDURE — 92134 CPTRZ OPH DX IMG PST SGM RTA: CPT | Performed by: SPECIALIST

## 2023-11-15 PROCEDURE — 92012 INTRM OPH EXAM EST PATIENT: CPT | Performed by: SPECIALIST

## 2023-11-15 ASSESSMENT — REFRACTION_AUTOREFRACTION
OS_SPHERE: +1.50
OD_AXIS: 089
OD_CYLINDER: -1.00
OS_CYLINDER: -0.50
OS_AXIS: 149
OD_SPHERE: +1.75

## 2023-11-15 ASSESSMENT — TEAR BREAK UP TIME (TBUT)
OD_TBUT: 3+
OS_TBUT: 3+

## 2023-11-15 ASSESSMENT — SPHEQUIV_DERIVED
OS_SPHEQUIV: 1.25
OD_SPHEQUIV: 1.25

## 2023-11-15 ASSESSMENT — CONFRONTATIONAL VISUAL FIELD TEST (CVF)
OD_FINDINGS: FULL
OS_FINDINGS: FULL

## 2023-12-20 ENCOUNTER — OFFICE (OUTPATIENT)
Dept: URBAN - METROPOLITAN AREA CLINIC 63 | Facility: CLINIC | Age: 68
Setting detail: OPHTHALMOLOGY
End: 2023-12-20
Payer: MEDICARE

## 2023-12-20 DIAGNOSIS — H35.3221: ICD-10-CM

## 2023-12-20 DIAGNOSIS — H35.3112: ICD-10-CM

## 2023-12-20 PROCEDURE — 67028 INJECTION EYE DRUG: CPT | Mod: LT | Performed by: SPECIALIST

## 2023-12-20 PROCEDURE — 92134 CPTRZ OPH DX IMG PST SGM RTA: CPT | Performed by: SPECIALIST

## 2023-12-20 ASSESSMENT — REFRACTION_AUTOREFRACTION
OD_SPHERE: +1.75
OD_AXIS: 089
OS_AXIS: 149
OS_CYLINDER: -0.50
OS_SPHERE: +1.50
OD_CYLINDER: -1.00

## 2023-12-20 ASSESSMENT — SPHEQUIV_DERIVED
OD_SPHEQUIV: 1.25
OS_SPHEQUIV: 1.25

## 2023-12-20 ASSESSMENT — TEAR BREAK UP TIME (TBUT)
OD_TBUT: 3+
OS_TBUT: 3+

## 2024-01-11 NOTE — DISCUSSION/SUMMARY
[FreeTextEntry1] : Assess  Moderate to severe COPD .   Using and benefiting from 02 therapy for sleep and for anything more than very mild exertion  Mild JOE  - no Rx required Obesity  Cor Pulmonale - resolved New 7 mm RUL pulmonary nodule - resolved  LDCT March 8,2021 Not worrisome  Got COVID 19 vaccination  Plan  Trelegy and albuterol  Bronchial valve assessment\ Disabled parking  6 month FU

## 2024-01-11 NOTE — HISTORY OF PRESENT ILLNESS
[FreeTextEntry1] :      62 yo male >40 pack year smoker (DC 3/16) with LLL pneumonia at [facility] - 3/22/16 and continuing at [facility] 4/14 - 4/18/16.  Cough, dyspnea despite prednisone 20mg - off one month taper and azith for 5 days.  Works at  [employer] for decades.  Around creams with fragranced, never affected him before.  No other exposures.  No rhinitis or reflux.  FH of longevity.. Moderate dyspnea on walking 2 blocks on level ground.  Moderate cough with minimal sputum production - worse with recumbency and improved with albuterol.  On Anoro and rescue albuterol. By June of 2016 - minimal mild cough.  Able to climb 2 flights of stairs rapidly without 02.  Enjoys work.  Not strenuous.  Doesn't need 02 at work.  Sleeps with 02 Productive cough on 2/15/17 Afib Concerned about possible puja Works at night  4/27/18 Working Not smoking Doing OK  9/14/18 Ill early September of 2018 Going for nuclear stress test  2/22/29 Fell at home Injured right shoulder Seeing Dr Alexander who advised weight loss Out on disability for injury Resp status is Good Denied 02 - will recertify  12/19/19 Recovering from recent URI PFT differed  Decadron IM  7/21/20 OK Gained weight during COVID The patient has been practicing safety guidelines for COVID-19 pandemic crisis (including social distancing, mask use and hand washing) and has been mostly homebound. No smoking since 3/16 Managing OK No cough, sputum or edema  10/22/20 Fall with leg injury - no fracture Seeing Dr Alexander next week No cough, sputum or edema  Not up to PFT today   1/13/21 Better with 20 lb weight loss (with diet advised by Dr Alexander) No cough, sputum or edema  3/17/21 Only using albuterol now Hasn't gone for COVID vaccination  The patient has been practicing safety guidelines for COVID-19 pandemic crisis (including social distancing, mask use and hand washing) and has been mostly homebound.  9/29/21 Doing OK on Trelegy and rescue albuterol No cough or sputum   9/14/23 RODRIGUEZ - 50 yards level ground Using a cane No cough or sputum   11/14/23 Marginal OK No cough or sputum Using a cane   12/27/23: Not a Bronchial valve candidate.

## 2024-04-29 RX ORDER — ALBUTEROL SULFATE 90 UG/1
108 (90 BASE) INHALANT RESPIRATORY (INHALATION)
Qty: 3 | Refills: 0 | Status: ACTIVE | COMMUNITY
Start: 2020-03-22 | End: 1900-01-01

## 2024-05-31 ENCOUNTER — APPOINTMENT (OUTPATIENT)
Dept: PULMONOLOGY | Facility: CLINIC | Age: 69
End: 2024-05-31
Payer: MEDICARE

## 2024-05-31 VITALS
HEIGHT: 67.5 IN | SYSTOLIC BLOOD PRESSURE: 152 MMHG | OXYGEN SATURATION: 92 % | WEIGHT: 238 LBS | HEART RATE: 96 BPM | RESPIRATION RATE: 16 BRPM | DIASTOLIC BLOOD PRESSURE: 92 MMHG | BODY MASS INDEX: 36.92 KG/M2

## 2024-05-31 DIAGNOSIS — E66.9 OBESITY, UNSPECIFIED: ICD-10-CM

## 2024-05-31 DIAGNOSIS — J44.9 CHRONIC OBSTRUCTIVE PULMONARY DISEASE, UNSPECIFIED: ICD-10-CM

## 2024-05-31 DIAGNOSIS — Z99.81 HYPOXEMIA: ICD-10-CM

## 2024-05-31 DIAGNOSIS — R06.09 OTHER FORMS OF DYSPNEA: ICD-10-CM

## 2024-05-31 DIAGNOSIS — Z87.891 PERSONAL HISTORY OF NICOTINE DEPENDENCE: ICD-10-CM

## 2024-05-31 DIAGNOSIS — J96.11 CHRONIC RESPIRATORY FAILURE WITH HYPOXIA: ICD-10-CM

## 2024-05-31 DIAGNOSIS — R09.02 HYPOXEMIA: ICD-10-CM

## 2024-05-31 PROCEDURE — G2211 COMPLEX E/M VISIT ADD ON: CPT

## 2024-05-31 PROCEDURE — G0296 VISIT TO DETERM LDCT ELIG: CPT

## 2024-05-31 PROCEDURE — 99213 OFFICE O/P EST LOW 20 MIN: CPT

## 2024-05-31 NOTE — HISTORY OF PRESENT ILLNESS
[FreeTextEntry1] :      60 yo male >40 pack year smoker (DC 3/16) with LLL pneumonia at [facility] - 3/22/16 and continuing at [facility] 4/14 - 4/18/16.  Cough, dyspnea despite prednisone 20mg - off one month taper and azith for 5 days.  Works at  [employer] for decades.  Around creams with fragranced, never affected him before.  No other exposures.  No rhinitis or reflux.  FH of longevity.. Moderate dyspnea on walking 2 blocks on level ground.  Moderate cough with minimal sputum production - worse with recumbency and improved with albuterol.  On Anoro and rescue albuterol. By June of 2016 - minimal mild cough.  Able to climb 2 flights of stairs rapidly without 02.  Enjoys work.  Not strenuous.  Doesn't need 02 at work.  Sleeps with 02 Productive cough on 2/15/17 Afib Concerned about possible puja Works at night  4/27/18 Working Not smoking Doing OK  9/14/18 Ill early September of 2018 Going for nuclear stress test  2/22/29 Fell at home Injured right shoulder Seeing Dr Alexander who advised weight loss Out on disability for injury Resp status is Good Denied 02 - will recertify  12/19/19 Recovering from recent URI PFT differed  Decadron IM  7/21/20 OK Gained weight during COVID The patient has been practicing safety guidelines for COVID-19 pandemic crisis (including social distancing, mask use and hand washing) and has been mostly homebound. No smoking since 3/16 Managing OK No cough, sputum or edema  10/22/20 Fall with leg injury - no fracture Seeing Dr Alexander next week No cough, sputum or edema  Not up to PFT today   1/13/21 Better with 20 lb weight loss (with diet advised by Dr Alexander) No cough, sputum or edema  3/17/21 Only using albuterol now Hasn't gone for COVID vaccination  The patient has been practicing safety guidelines for COVID-19 pandemic crisis (including social distancing, mask use and hand washing) and has been mostly homebound.  9/29/21 Doing OK on Trelegy and rescue albuterol No cough or sputum   9/14/23 RODRIGUEZ - 50 yards level ground Using a cane No cough or sputum   11/14/23 Marginal OK No cough or sputum Using a cane   12/27/23: Not a Bronchial valve candidate.   5/31/24 Leg edema RODRIGUEZ

## 2024-05-31 NOTE — COUNSELING
[Potential consequences of obesity discussed] : Potential consequences of obesity discussed [Benefits of weight loss discussed] : Benefits of weight loss discussed [Structured Weight Management Program suggested:] : Structured weight management program suggested [Encouraged to maintain food diary] : Encouraged to maintain food diary [Encouraged to increase physical activity] : Encouraged to increase physical activity [Encouraged to use exercise tracking device] : Encouraged to use exercise tracking device [ - Annual Lung Cancer Screening/Share Decision Making Discussion] : Annual Lung Cancer Screening/Share Decision Making Discussion. (I have advised this patient to have a Low Dose CT (LDCT) scan of the lungs and have discussed the following with the patient in a shared decision making discussion:   Benefits of Detection and Early Treatment: There is adequate evidence that annual screening for lung cancer with LDCT in a population of high-risk persons can prevent a substantial number of lung cancer-related deaths. The magnitude of benefit depends on the individual patient's risk for lung cancer, as those who are at highest risk are most likely to benefit. Screening cannot prevent most lung cancer-related deaths, and does not replace smoking cessation. Harms of Detection and Early Intervention and Treatment: The harms associated with LDCT screening include false-negative and false-positive results, incidental findings, over diagnosis, and radiation exposure. False-positive LDCT results occur in a substantial proportion of screened persons; 95% of all positive results do not lead to a diagnosis of cancer. In a high-quality screening program, further imaging can resolve most false-positive results; however, some patients may require invasive procedures. Radiation harms, including cancer resulting from cumulative exposure to radiation, vary depending on the age at the start of screening; the number of scans received; and the person's exposure to other sources of radiation, particularly other medical imaging.)

## 2024-05-31 NOTE — PHYSICAL EXAM
[General Appearance - Well Developed] : well developed [General Appearance - Well Nourished] : well nourished [Neck Appearance] : the appearance of the neck was normal [] : the neck was supple [Jugular Venous Distention Increased] : there was no jugular-venous distention [Thyroid Diffuse Enlargement] : the thyroid was not enlarged [Heart Sounds] : normal S1 and S2 [Arterial Pulses Normal] : the arterial pulses were normal [Edema] : no peripheral edema present [Nail Clubbing] : no clubbing of the fingernails [Cyanosis, Localized] : no localized cyanosis [FreeTextEntry1] : Scattered rhonchi.  Better post cough.  No rales

## 2024-05-31 NOTE — DISCUSSION/SUMMARY
[FreeTextEntry1] : Assess  Moderate to severe COPD .   Using and benefiting from 02 therapy for sleep and for anything more than very mild exertion  Mild JOE  - no Rx required Obesity  Cor Pulmonale - resolved New 7 mm RUL pulmonary nodule - resolved  LDCT March 8,2021 Not worrisome  Got COVID 19 vaccination  Plan  Trelegy and albuterol  Bronchial valve assessment  Disabled parking  6 month FU

## 2024-05-31 NOTE — RESULTS/DATA
[TextEntry] : LDCT 10/7/23: Moderate emphysema  Ex Ox on 5/31/24: Resting RA=92: Ex Ox =86; Ex OX on 2 LPM pulsed =91

## 2024-07-31 ENCOUNTER — RX RENEWAL (OUTPATIENT)
Age: 69
End: 2024-07-31

## 2024-10-08 ENCOUNTER — NON-APPOINTMENT (OUTPATIENT)
Age: 69
End: 2024-10-08

## 2024-10-08 VITALS — HEIGHT: 67.5 IN | WEIGHT: 215 LBS | BODY MASS INDEX: 33.35 KG/M2

## 2024-10-08 DIAGNOSIS — Z87.891 PERSONAL HISTORY OF NICOTINE DEPENDENCE: ICD-10-CM

## 2024-10-19 ENCOUNTER — APPOINTMENT (OUTPATIENT)
Dept: CT IMAGING | Facility: CLINIC | Age: 69
End: 2024-10-19

## 2024-10-19 ENCOUNTER — OUTPATIENT (OUTPATIENT)
Dept: OUTPATIENT SERVICES | Facility: HOSPITAL | Age: 69
LOS: 1 days | End: 2024-10-19
Payer: MEDICARE

## 2024-10-19 DIAGNOSIS — Z87.891 PERSONAL HISTORY OF NICOTINE DEPENDENCE: ICD-10-CM

## 2024-10-19 DIAGNOSIS — R09.02 HYPOXEMIA: ICD-10-CM

## 2024-10-19 PROCEDURE — 71271 CT THORAX LUNG CANCER SCR C-: CPT | Mod: 26

## 2024-11-01 ENCOUNTER — APPOINTMENT (OUTPATIENT)
Dept: OTOLARYNGOLOGY | Facility: CLINIC | Age: 69
End: 2024-11-01
Payer: MEDICARE

## 2024-11-01 VITALS
BODY MASS INDEX: 32.21 KG/M2 | HEART RATE: 78 BPM | DIASTOLIC BLOOD PRESSURE: 75 MMHG | SYSTOLIC BLOOD PRESSURE: 166 MMHG | HEIGHT: 70 IN | WEIGHT: 225 LBS

## 2024-11-01 DIAGNOSIS — R09.82 POSTNASAL DRIP: ICD-10-CM

## 2024-11-01 DIAGNOSIS — F17.201 NICOTINE DEPENDENCE, UNSPECIFIED, IN REMISSION: ICD-10-CM

## 2024-11-01 DIAGNOSIS — R49.0 DYSPHONIA: ICD-10-CM

## 2024-11-01 PROCEDURE — 31575 DIAGNOSTIC LARYNGOSCOPY: CPT

## 2024-11-01 PROCEDURE — 99203 OFFICE O/P NEW LOW 30 MIN: CPT | Mod: 25

## 2024-11-21 ENCOUNTER — APPOINTMENT (OUTPATIENT)
Dept: PULMONOLOGY | Facility: CLINIC | Age: 69
End: 2024-11-21
Payer: MEDICARE

## 2024-11-21 VITALS
WEIGHT: 226 LBS | OXYGEN SATURATION: 94 % | RESPIRATION RATE: 16 BRPM | SYSTOLIC BLOOD PRESSURE: 142 MMHG | HEIGHT: 70 IN | HEART RATE: 89 BPM | DIASTOLIC BLOOD PRESSURE: 85 MMHG | BODY MASS INDEX: 32.35 KG/M2

## 2024-11-21 DIAGNOSIS — J96.11 CHRONIC RESPIRATORY FAILURE WITH HYPOXIA: ICD-10-CM

## 2024-11-21 DIAGNOSIS — Z87.891 PERSONAL HISTORY OF NICOTINE DEPENDENCE: ICD-10-CM

## 2024-11-21 DIAGNOSIS — J44.9 CHRONIC OBSTRUCTIVE PULMONARY DISEASE, UNSPECIFIED: ICD-10-CM

## 2024-11-21 PROCEDURE — 99213 OFFICE O/P EST LOW 20 MIN: CPT

## 2024-11-21 PROCEDURE — G2211 COMPLEX E/M VISIT ADD ON: CPT

## 2025-01-02 ENCOUNTER — RX RENEWAL (OUTPATIENT)
Age: 70
End: 2025-01-02

## 2025-01-02 ENCOUNTER — TRANSCRIPTION ENCOUNTER (OUTPATIENT)
Age: 70
End: 2025-01-02

## 2025-05-27 ENCOUNTER — NON-APPOINTMENT (OUTPATIENT)
Age: 70
End: 2025-05-27

## 2025-05-29 ENCOUNTER — APPOINTMENT (OUTPATIENT)
Dept: PULMONOLOGY | Facility: CLINIC | Age: 70
End: 2025-05-29
Payer: MEDICARE

## 2025-05-29 VITALS
BODY MASS INDEX: 33.77 KG/M2 | HEART RATE: 94 BPM | RESPIRATION RATE: 16 BRPM | DIASTOLIC BLOOD PRESSURE: 84 MMHG | HEIGHT: 69 IN | OXYGEN SATURATION: 94 % | WEIGHT: 228 LBS | SYSTOLIC BLOOD PRESSURE: 124 MMHG

## 2025-05-29 DIAGNOSIS — Z87.891 PERSONAL HISTORY OF NICOTINE DEPENDENCE: ICD-10-CM

## 2025-05-29 DIAGNOSIS — E66.9 OBESITY, UNSPECIFIED: ICD-10-CM

## 2025-05-29 DIAGNOSIS — J96.11 CHRONIC RESPIRATORY FAILURE WITH HYPOXIA: ICD-10-CM

## 2025-05-29 DIAGNOSIS — J44.9 CHRONIC OBSTRUCTIVE PULMONARY DISEASE, UNSPECIFIED: ICD-10-CM

## 2025-05-29 PROCEDURE — 99213 OFFICE O/P EST LOW 20 MIN: CPT

## 2025-05-29 PROCEDURE — G2211 COMPLEX E/M VISIT ADD ON: CPT

## 2025-05-29 PROCEDURE — G0296 VISIT TO DETERM LDCT ELIG: CPT
